# Patient Record
Sex: FEMALE | Race: OTHER | Employment: FULL TIME | URBAN - METROPOLITAN AREA
[De-identification: names, ages, dates, MRNs, and addresses within clinical notes are randomized per-mention and may not be internally consistent; named-entity substitution may affect disease eponyms.]

---

## 2024-03-07 ENCOUNTER — OFFICE VISIT (OUTPATIENT)
Age: 64
End: 2024-03-07
Payer: COMMERCIAL

## 2024-03-07 VITALS
SYSTOLIC BLOOD PRESSURE: 132 MMHG | HEIGHT: 62 IN | WEIGHT: 198.4 LBS | DIASTOLIC BLOOD PRESSURE: 86 MMHG | BODY MASS INDEX: 36.51 KG/M2

## 2024-03-07 DIAGNOSIS — R93.89 THICKENED ENDOMETRIUM: ICD-10-CM

## 2024-03-07 DIAGNOSIS — N95.0 POSTMENOPAUSAL BLEEDING: Primary | ICD-10-CM

## 2024-03-07 PROBLEM — J45.909 ASTHMA: Status: ACTIVE | Noted: 2020-05-15

## 2024-03-07 PROBLEM — I10 HYPERTENSION GOAL BP (BLOOD PRESSURE) < 140/90: Status: ACTIVE | Noted: 2021-04-14

## 2024-03-07 PROCEDURE — 99203 OFFICE O/P NEW LOW 30 MIN: CPT | Performed by: OBSTETRICS & GYNECOLOGY

## 2024-03-07 RX ORDER — ATORVASTATIN CALCIUM 20 MG/1
20 TABLET, FILM COATED ORAL DAILY
COMMUNITY
Start: 2023-10-05

## 2024-03-07 RX ORDER — CYCLOBENZAPRINE HCL 10 MG
10 TABLET ORAL
COMMUNITY
Start: 2024-01-10

## 2024-03-07 RX ORDER — LISINOPRIL 10 MG/1
1 TABLET ORAL EVERY MORNING
COMMUNITY
Start: 2023-12-14

## 2024-03-07 NOTE — ASSESSMENT & PLAN NOTE
Reviewed plan that was previously made prior to transfer from [facility] further sampling with D&C hysteroscopy given thickened endometrium and suboptimal sample from EMB. Sampled that was examined from EMB was negative for hyperplasia, atypia and malignancy. Discussed with patient expectant management and deferring D&C hysteroscopy until she has any further bleeding episodes, but she would like to have this completed now.  Risks, benefits, and alternatives have been reviewed with the patient, including but not limited to infection, bleeding, injury to the uterus and surrounding organs, such as bowel, bladder, blood vessels, nerves and ureters, risks of anesthesia, blood clots and death.  The details of the procedure were discussed in detail. Plan for EUA, D&C, hysteroscopy and poss polypectomy- Consents have been signed and all questions have been answered to her satisfaction.

## 2024-03-07 NOTE — PROGRESS NOTES
Assessment/Plan:       Problem List Items Addressed This Visit       Postmenopausal bleeding - Primary     Reviewed plan that was previously made prior to transfer from [facility]- rec further sampling with D&C hysteroscopy given thickened endometrium and suboptimal sample from EMB. Sampled that was examined from EMB was negative for hyperplasia, atypia and malignancy. Discussed with patient expectant management and deferring D&C hysteroscopy until she has any further bleeding episodes, but she would like to have this completed now.  Risks, benefits, and alternatives have been reviewed with the patient, including but not limited to infection, bleeding, injury to the uterus and surrounding organs, such as bowel, bladder, blood vessels, nerves and ureters, risks of anesthesia, blood clots and death.  The details of the procedure were discussed in detail. Plan for EUA, D&C, hysteroscopy and poss polypectomy- Consents have been signed and all questions have been answered to her satisfaction.           Thickened endometrium         Subjective:      Patient ID: Saira Rosario is a 63 y.o. female here for pre-op visit.     HPI    New patient- was seeing [facility], had to transfer due to insurance reasons.   Last seen in December for postmenopausal bleeding. Workup started by PCP- imaging, pap smear. Ultimately had EMB. Was rec for hysteroscopy D&C due to suboptimal specimen.     EMB 11/28/23-  DIAGNOSIS  :     A. Endometrium, biopsy:   Superficial strips of atrophic endometrium, negative for hyperplasia,   atypia and malignancy.  See note.   Several small pieces of endocervical epithelium with acute inflammation   and reactive change, negative for dysplasia; see comment.     Note:  The quantity of the specimen is suboptimal for the evaluation of   endometrium.  Patient follow up with the consideration for additional   sampling needs to be given if clinical concern remains.       Pap smear 11/28/23    CYTOLOGIC DIAGNOSIS :   Negative  "for Intraepithelial Lesion or Malignancy.   Other Non-Neoplastic Findings:   Reactive/reparative cellular changes associated with inflammation   Atrophic pattern.   Adequacy:   Adequate for evaluation.     Test Code      Result       Result Date/Time   HPV Other Negative      11/30/2023 01:02            HPV 16 Negative      11/30/2023 01:02            HPV 18 Negative      11/30/2023 01:02            HPV Interpretation SEE TEXT                     Text Result/Comments:         HPV types 16, 18, 31, 33, 35, 39, 45, 51, 52, 56, 58, 59, 66, and   68 DNA were not detected.       Had pelvic u/s on 12/22/23-   FINDINGS     The uterus measures 7 x 3.8 x 5.7 cm.  Echogenic myometrial lesions identified measure 2 cm posteriorly and 1.1 cm anteriorly (previously 2.3 cm and 0.9 cm), suggesting lipoleiomyomas (atypical fibroids).  The endometrium measures 7 mm in thickness.   The right ovary measures 1.8 x 2.3 x 0.9 cm and is unremarkable. The left ovary measures 3.1 x 1.5 x 1.1 cm and is unremarkable.   There is no adnexal mass. There is no free fluid in the cul-de-sac.     Has been doing well since last seeing GYN in Dec.   No further bleeding episodes.  Ultimately could not have hysteroscopy, D&C at [facility] due to out of network for insurance- so now transferring care.    Past Medical History:   Diagnosis Date    High blood pressure     High cholesterol        Past Surgical History:   Procedure Laterality Date    BREAST LUMPECTOMY Right     benign    HERNIA REPAIR           The following portions of the patient's history were reviewed and updated as appropriate: allergies, current medications, past family history, past medical history, past social history, past surgical history, and problem list.    Review of Systems    Negative unless otherwise noted in HPI.     Objective:      /86 (BP Location: Left arm, Patient Position: Sitting, Cuff Size: Adult)   Ht 5' 2\" (1.575 m)   Wt 90 kg (198 lb 6.4 oz)   BMI 36.29 kg/m² "        Physical Exam  Constitutional:       General: She is not in acute distress.  HENT:      Head: Normocephalic and atraumatic.      Mouth/Throat:      Mouth: Mucous membranes are moist.   Cardiovascular:      Rate and Rhythm: Normal rate.   Pulmonary:      Effort: Pulmonary effort is normal. No respiratory distress.   Abdominal:      General: There is no distension.      Palpations: Abdomen is soft.      Tenderness: There is no abdominal tenderness.   Genitourinary:     Comments: Deferred to OR  Skin:     General: Skin is warm and dry.   Neurological:      Mental Status: She is alert.   Psychiatric:         Mood and Affect: Mood normal.         Behavior: Behavior normal.

## 2024-03-08 ENCOUNTER — TELEPHONE (OUTPATIENT)
Dept: OBGYN CLINIC | Facility: CLINIC | Age: 64
End: 2024-03-08

## 2024-03-08 NOTE — TELEPHONE ENCOUNTER
----- Message from Rama Pickens DO sent at 3/7/2024  1:44 PM EST -----  [facility] GYN Department  Surgery Scheduling Sheet    Patient Name: Saira Rosario  : 1960    Provider: Rama Pickens DO     Needed: no; Language: N/A    Procedure: exam under anesthesia, dilation and curettage , and hysteroscopy    Diagnosis:  postmenopausal bleeding, thickened endometrium    Special Needs or Equipment: none    Anesthesia: IV sedation with anesthesia    Length of stay: outpatient  Does patient have  comorbid conditions that will require close perioperative monitoring prior to safe discharge: no  The patient has comorbid conditions that will require close perioperative monitoring prior to safe discharge, including N/A. This may require acute care beyond the usual and routine recovery period. As such, inpatient admission post-operatively is expected and appropriate, and anticipated hospital length of stay will be >2 midnights.    Pre-Admission Testing Needed: no   Labs ordered: cbc, cmp, and EKG    PAT's recommended in prep for procedure ordered?: No    Medical Clearance Needed: no; Provider: N/A    MA Form Signed (tubals/hysterectomy): Not Indicated    Surgical Drink Given: no     How many days out of work: 1 week(s)     How many days no drivin day(s)       Are other appointments needed?  no  Interval for post op appt: 2 week(s)     For Surgical Scheduler:    [place]:     Pre-op Appt:     Post op Appt:    Consult/Medical clearance appt:

## 2024-03-08 NOTE — TELEPHONE ENCOUNTER
Talked to patient she is scheduled for her surgical procedure on 4/8/2024 with Dr. Pickens in the [facility] OR, Patient aware of lab work and EKG that is needed. Post op appt scheduled for 4/24/2024 at 2:45pm in the [place] office.

## 2024-03-27 ENCOUNTER — TELEPHONE (OUTPATIENT)
Dept: OBGYN CLINIC | Facility: CLINIC | Age: 64
End: 2024-03-27

## 2024-03-27 NOTE — TELEPHONE ENCOUNTER
Patient called and needed to reschedule her surgical date wit Dr. Pickens, Patient is now rescheduled to 5/13/2024 at the [facility].

## 2024-04-27 ENCOUNTER — APPOINTMENT (OUTPATIENT)
Dept: LAB | Facility: HOSPITAL | Age: 64
End: 2024-04-27
Payer: COMMERCIAL

## 2024-04-27 ENCOUNTER — OFFICE VISIT (OUTPATIENT)
Dept: LAB | Facility: HOSPITAL | Age: 64
End: 2024-04-27
Payer: COMMERCIAL

## 2024-04-27 DIAGNOSIS — Z01.818 PRE-OP TESTING: ICD-10-CM

## 2024-04-27 LAB
ALBUMIN SERPL BCP-MCNC: 4.3 G/DL (ref 3.5–5)
ALP SERPL-CCNC: 68 U/L (ref 34–104)
ALT SERPL W P-5'-P-CCNC: 18 U/L (ref 7–52)
ANION GAP SERPL CALCULATED.3IONS-SCNC: 5 MMOL/L (ref 4–13)
AST SERPL W P-5'-P-CCNC: 16 U/L (ref 13–39)
ATRIAL RATE: 78 BPM
BILIRUB SERPL-MCNC: 0.52 MG/DL (ref 0.2–1)
BUN SERPL-MCNC: 26 MG/DL (ref 5–25)
CALCIUM SERPL-MCNC: 9.1 MG/DL (ref 8.4–10.2)
CHLORIDE SERPL-SCNC: 105 MMOL/L (ref 96–108)
CO2 SERPL-SCNC: 33 MMOL/L (ref 21–32)
CREAT SERPL-MCNC: 0.63 MG/DL (ref 0.6–1.3)
ERYTHROCYTE [DISTWIDTH] IN BLOOD BY AUTOMATED COUNT: 12.2 % (ref 11.6–15.1)
GFR SERPL CREATININE-BSD FRML MDRD: 97 ML/MIN/1.73SQ M
GLUCOSE P FAST SERPL-MCNC: 111 MG/DL (ref 65–99)
HCT VFR BLD AUTO: 42.3 % (ref 34.8–46.1)
HGB BLD-MCNC: 12.7 G/DL (ref 11.5–15.4)
MCH RBC QN AUTO: 28.3 PG (ref 26.8–34.3)
MCHC RBC AUTO-ENTMCNC: 30 G/DL (ref 31.4–37.4)
MCV RBC AUTO: 94 FL (ref 82–98)
P AXIS: 69 DEGREES
PLATELET # BLD AUTO: 242 THOUSANDS/UL (ref 149–390)
PMV BLD AUTO: 12.4 FL (ref 8.9–12.7)
POTASSIUM SERPL-SCNC: 4 MMOL/L (ref 3.5–5.3)
PR INTERVAL: 124 MS
PROT SERPL-MCNC: 7.3 G/DL (ref 6.4–8.4)
QRS AXIS: 17 DEGREES
QRSD INTERVAL: 72 MS
QT INTERVAL: 392 MS
QTC INTERVAL: 446 MS
RBC # BLD AUTO: 4.48 MILLION/UL (ref 3.81–5.12)
SODIUM SERPL-SCNC: 143 MMOL/L (ref 135–147)
T WAVE AXIS: 32 DEGREES
VENTRICULAR RATE: 78 BPM
WBC # BLD AUTO: 8.92 THOUSAND/UL (ref 4.31–10.16)

## 2024-04-27 PROCEDURE — 80053 COMPREHEN METABOLIC PANEL: CPT

## 2024-04-27 PROCEDURE — 93005 ELECTROCARDIOGRAM TRACING: CPT

## 2024-04-27 PROCEDURE — 93010 ELECTROCARDIOGRAM REPORT: CPT | Performed by: INTERNAL MEDICINE

## 2024-04-27 PROCEDURE — 85027 COMPLETE CBC AUTOMATED: CPT

## 2024-04-27 PROCEDURE — 36415 COLL VENOUS BLD VENIPUNCTURE: CPT

## 2024-05-12 ENCOUNTER — ANESTHESIA EVENT (OUTPATIENT)
Dept: PERIOP | Facility: HOSPITAL | Age: 64
End: 2024-05-12
Payer: COMMERCIAL

## 2024-05-13 ENCOUNTER — HOSPITAL ENCOUNTER (OUTPATIENT)
Facility: HOSPITAL | Age: 64
Setting detail: OUTPATIENT SURGERY
Discharge: HOME | End: 2024-05-13
Attending: OBSTETRICS & GYNECOLOGY | Admitting: OBSTETRICS & GYNECOLOGY
Payer: COMMERCIAL

## 2024-05-13 ENCOUNTER — ANESTHESIA (OUTPATIENT)
Dept: PERIOP | Facility: HOSPITAL | Age: 64
End: 2024-05-13
Payer: COMMERCIAL

## 2024-05-13 VITALS
OXYGEN SATURATION: 96 % | RESPIRATION RATE: 18 BRPM | SYSTOLIC BLOOD PRESSURE: 142 MMHG | DIASTOLIC BLOOD PRESSURE: 72 MMHG | TEMPERATURE: 98.8 F | HEIGHT: 62 IN | WEIGHT: 198.41 LBS | HEART RATE: 87 BPM | BODY MASS INDEX: 36.51 KG/M2

## 2024-05-13 DIAGNOSIS — R93.89 THICKENED ENDOMETRIUM: ICD-10-CM

## 2024-05-13 DIAGNOSIS — N95.0 PMB (POSTMENOPAUSAL BLEEDING): ICD-10-CM

## 2024-05-13 PROBLEM — N84.0 ENDOMETRIAL POLYP: Status: ACTIVE | Noted: 2024-05-13

## 2024-05-13 PROCEDURE — NC001 PR NO CHARGE: Performed by: OBSTETRICS & GYNECOLOGY

## 2024-05-13 PROCEDURE — 58558 HYSTEROSCOPY BIOPSY: CPT | Performed by: OBSTETRICS & GYNECOLOGY

## 2024-05-13 PROCEDURE — 88305 TISSUE EXAM BY PATHOLOGIST: CPT | Performed by: PATHOLOGY

## 2024-05-13 RX ORDER — ACETAMINOPHEN 325 MG/1
975 TABLET ORAL EVERY 6 HOURS PRN
Status: DISCONTINUED | OUTPATIENT
Start: 2024-05-13 | End: 2024-05-13 | Stop reason: HOSPADM

## 2024-05-13 RX ORDER — MAGNESIUM HYDROXIDE 1200 MG/15ML
LIQUID ORAL AS NEEDED
Status: DISCONTINUED | OUTPATIENT
Start: 2024-05-13 | End: 2024-05-13 | Stop reason: HOSPADM

## 2024-05-13 RX ORDER — HYDROMORPHONE HCL/PF 1 MG/ML
0.5 SYRINGE (ML) INJECTION
Status: CANCELLED | OUTPATIENT
Start: 2024-05-13

## 2024-05-13 RX ORDER — LIDOCAINE HYDROCHLORIDE 10 MG/ML
INJECTION, SOLUTION EPIDURAL; INFILTRATION; INTRACAUDAL; PERINEURAL AS NEEDED
Status: DISCONTINUED | OUTPATIENT
Start: 2024-05-13 | End: 2024-05-13

## 2024-05-13 RX ORDER — PROPOFOL 10 MG/ML
INJECTION, EMULSION INTRAVENOUS CONTINUOUS PRN
Status: DISCONTINUED | OUTPATIENT
Start: 2024-05-13 | End: 2024-05-13

## 2024-05-13 RX ORDER — MIDAZOLAM HYDROCHLORIDE 2 MG/2ML
INJECTION, SOLUTION INTRAMUSCULAR; INTRAVENOUS AS NEEDED
Status: DISCONTINUED | OUTPATIENT
Start: 2024-05-13 | End: 2024-05-13

## 2024-05-13 RX ORDER — IBUPROFEN 600 MG/1
600 TABLET ORAL EVERY 6 HOURS PRN
Status: DISCONTINUED | OUTPATIENT
Start: 2024-05-13 | End: 2024-05-13 | Stop reason: HOSPADM

## 2024-05-13 RX ORDER — GLYCOPYRROLATE 0.2 MG/ML
INJECTION INTRAMUSCULAR; INTRAVENOUS AS NEEDED
Status: DISCONTINUED | OUTPATIENT
Start: 2024-05-13 | End: 2024-05-13

## 2024-05-13 RX ORDER — SODIUM CHLORIDE, SODIUM LACTATE, POTASSIUM CHLORIDE, CALCIUM CHLORIDE 600; 310; 30; 20 MG/100ML; MG/100ML; MG/100ML; MG/100ML
INJECTION, SOLUTION INTRAVENOUS CONTINUOUS PRN
Status: DISCONTINUED | OUTPATIENT
Start: 2024-05-13 | End: 2024-05-13

## 2024-05-13 RX ORDER — FENTANYL CITRATE 50 UG/ML
INJECTION, SOLUTION INTRAMUSCULAR; INTRAVENOUS AS NEEDED
Status: DISCONTINUED | OUTPATIENT
Start: 2024-05-13 | End: 2024-05-13

## 2024-05-13 RX ORDER — ONDANSETRON 2 MG/ML
4 INJECTION INTRAMUSCULAR; INTRAVENOUS ONCE AS NEEDED
Status: CANCELLED | OUTPATIENT
Start: 2024-05-13

## 2024-05-13 RX ORDER — KETAMINE HYDROCHLORIDE 50 MG/ML
INJECTION, SOLUTION INTRAMUSCULAR; INTRAVENOUS AS NEEDED
Status: DISCONTINUED | OUTPATIENT
Start: 2024-05-13 | End: 2024-05-13

## 2024-05-13 RX ORDER — FENTANYL CITRATE/PF 50 MCG/ML
50 SYRINGE (ML) INJECTION
Status: CANCELLED | OUTPATIENT
Start: 2024-05-13

## 2024-05-13 RX ADMIN — KETAMINE HYDROCHLORIDE 10 MG: 50 INJECTION INTRAMUSCULAR; INTRAVENOUS at 08:12

## 2024-05-13 RX ADMIN — LIDOCAINE HYDROCHLORIDE 50 MG: 10 INJECTION, SOLUTION EPIDURAL; INFILTRATION; INTRACAUDAL; PERINEURAL at 08:02

## 2024-05-13 RX ADMIN — MIDAZOLAM HYDROCHLORIDE 2 MG: 1 INJECTION, SOLUTION INTRAMUSCULAR; INTRAVENOUS at 07:55

## 2024-05-13 RX ADMIN — GLYCOPYRROLATE 0.1 MG: 0.2 INJECTION INTRAMUSCULAR; INTRAVENOUS at 07:55

## 2024-05-13 RX ADMIN — PROPOFOL 70 MCG/KG/MIN: 10 INJECTION, EMULSION INTRAVENOUS at 08:02

## 2024-05-13 RX ADMIN — KETAMINE HYDROCHLORIDE 10 MG: 50 INJECTION INTRAMUSCULAR; INTRAVENOUS at 08:02

## 2024-05-13 RX ADMIN — SODIUM CHLORIDE, SODIUM LACTATE, POTASSIUM CHLORIDE, AND CALCIUM CHLORIDE: .6; .31; .03; .02 INJECTION, SOLUTION INTRAVENOUS at 07:55

## 2024-05-13 RX ADMIN — FENTANYL CITRATE 50 MCG: 50 INJECTION, SOLUTION INTRAMUSCULAR; INTRAVENOUS at 08:10

## 2024-05-13 RX ADMIN — FENTANYL CITRATE 50 MCG: 50 INJECTION, SOLUTION INTRAMUSCULAR; INTRAVENOUS at 08:02

## 2024-05-13 NOTE — ANESTHESIA PREPROCEDURE EVALUATION
Procedure:  D&C HYSTEROSCOPY, EXAM UNDER ANESTHESIA (Uterus)  POSSIBLE POLYPECTOMY (Uterus)    Relevant Problems   CARDIO   (+) HLD (hyperlipidemia)   (+) Hypertension goal BP (blood pressure) < 140/90      PULMONARY   (+) Asthma        Physical Exam    Airway    Mallampati score: III         Dental   No notable dental hx     Cardiovascular  Cardiovascular exam normal    Pulmonary  Pulmonary exam normal     Other Findings  post-pubertal.    Anesthesia Plan  ASA Score- 2     Anesthesia Type- IV sedation with anesthesia with ASA Monitors.         Additional Monitors:     Airway Plan:            Plan Factors-Exercise tolerance (METS): >4 METS.    Chart reviewed.    Patient summary reviewed.    Patient is not a current smoker.              Induction-     Postoperative Plan-     Informed Consent- Anesthetic plan and risks discussed with patient.  I personally reviewed this patient with the CRNA. Discussed and agreed on the Anesthesia Plan with the CRNA..

## 2024-05-13 NOTE — ANESTHESIA POSTPROCEDURE EVALUATION
Post-Op Assessment Note    CV Status:  Stable  Pain Score: 0    Pain management: adequate       Mental Status:  Alert and awake   Hydration Status:  Euvolemic   PONV Controlled:  Controlled   Airway Patency:  Patent and adequate  Two or more mitigation strategies used for obstructive sleep apnea   Post Op Vitals Reviewed: Yes    No anethesia notable event occurred.    Staff: CRNA               BP   129/75   Temp 98.8   Pulse 100   Resp 20   SpO2 94

## 2024-05-13 NOTE — OP NOTE
OPERATIVE REPORT  PATIENT NAME: Saira Rosario    :  1960  MRN: [id number]  Pt Location: [facility] OR ROOM 02    SURGERY DATE: 2024    Surgeons and Role:     * Rama Pickens DO - Primary    Preop Diagnosis:  PMB (postmenopausal bleeding) [N95.0]  Thickened endometrium [R93.89]    Post-Op Diagnosis Codes:     * PMB (postmenopausal bleeding) [N95.0]     * Thickened endometrium [R93.89]    Procedure(s):  D&C HYSTEROSCOPY. EXAM UNDER ANESTHESIA  POSSIBLE POLYPECTOMY    Specimen(s):  ID Type Source Tests Collected by Time Destination   1 : ENDOMETRIAL CURETTINGS Tissue Endometrium TISSUE EXAM Rama Emyclive  2024 0827    2 : ENDOMETRIAL POLYP Tissue Polyp, Cervical/Endometrial TISSUE EXAM Rama Pickens  2024 0829        Estimated Blood Loss:   Minimal    Drains:  * No LDAs found *    Anesthesia Type:   IV Sedation with Anesthesia    Operative Indications:  PMB (postmenopausal bleeding) [N95.0]  Thickened endometrium [R93.89]  Endometrial polyp    Operative Findings:  Normal appearing external genitalia, small anteverted uterus sounding to 7 cm, endometrial polyp attached at right fundus extending down through cervix, otherwise atrophic appearing endometrium.     Complications:   None    Specimens: endometrial currettings and endometrial polyp    Procedure and Technique:      Procedure in Detail:  The patient was taken to the Operating Room where she was identified as Saira Rosario.  Monitored Local Anesthesia with Sedation anesthesia was obtained without difficulty and the patient was placed in the dorsal lithotomy position where the exam under anesthesia revealed the above noted findings.  The vagina and perineum were prepped and draped in the usual sterile fashion.  A time out procedure was performed and after all team members agreed, a weighted speculum was placed in the patient's vagina with a Jaky to retract anteriorly. The anterior lip of the cervix was grasped with a single tooth  tenaculum.  The uterus was sounded in an anteverted fashion to 7 cm.  The cervix was gently dilated up to 23 Fr. using the Ted dilators without difficulty.  The hysteroscope was advanced into the uterine cavity with the above noted findings.   The polyp forceps were advanced into the uterine cavity and with 1 passes, the polyp was obtained for specimen. The hysteroscope was advanced again into the cavity and endometrial polyp was still remaining. The symphion resectoscope was utilized to resect the remaining polyp under direct visualization. Using a sharp curette, gentle curettage was performed.  Good hemostasis was noted.  Endometrial curettings were passed off the field for specimen.  There was no bleeding noted at the os.  Tenaculum was removed.  No bleeding was noted at the tenaculum site.  Weighted speculum was removed.     The patient tolerated the procedure well.  Sponge, lap, needle and instrument counts were correct x 2.  The patient was taken to the Recovery Room in an awake and stable condition.       Patient Disposition:  PACU         SIGNATURE: Rama Pickens DO  DATE: May 13, 2024  TIME: 8:31 AM

## 2024-05-13 NOTE — H&P
H&P Exam - Gynecology   Saira Rosario 63 y.o. female MRN: [id number]  Unit/Bed#: OR POOL Encounter: [id number]    Assessment/Plan     Assessment/ Plan:  Postmenopausal bleeding - Primary        Reviewed plan that was previously made prior to transfer from [facility]- rec further sampling with D&C hysteroscopy given thickened endometrium and suboptimal sample from EMB. Sampled that was examined from EMB was negative for hyperplasia, atypia and malignancy. Discussed with patient expectant management and deferring D&C hysteroscopy until she has any further bleeding episodes, but she would like to have this completed now.  Risks, benefits, and alternatives have been reviewed with the patient, including but not limited to infection, bleeding, injury to the uterus and surrounding organs, such as bowel, bladder, blood vessels, nerves and ureters, risks of anesthesia, blood clots and death.  The details of the procedure were discussed in detail. Plan for EUA, D&C, hysteroscopy and poss polypectomy- Consents have been signed and all questions have been answered to her satisfaction.              Thickened endometrium         History of Present Illness     HPI:  Saira Rosario is a 63 y.o. female who presents today for scheduled surgery. She was seen at [facility] in Dec for PMB and subsequent suboptimal EMB in the office. Was rec for d&C hysteroscopy prior to transfer of care,  had to transfer to [facility] due to insurance reasons.    EMB 11/28/23  A. Endometrium, biopsy:   Superficial strips of atrophic endometrium, negative for hyperplasia,   atypia and malignancy.  See note.   Several small pieces of endocervical epithelium with acute inflammation   and reactive change, negative for dysplasia; see comment.     Note:  The quantity of the specimen is suboptimal for the evaluation of   endometrium.  Patient follow up with the consideration for additional   sampling needs to be given if clinical concern remains.     Pelvic u/s 12/22/23-  "  FINDINGS     The uterus measures 7 x 3.8 x 5.7 cm.  Echogenic myometrial lesions identified measure 2 cm posteriorly and 1.1 cm anteriorly (previously 2.3 cm and 0.9 cm), suggesting lipoleiomyomas (atypical fibroids).  The endometrium measures 7 mm in thickness.   The right ovary measures 1.8 x 2.3 x 0.9 cm and is unremarkable. The left ovary measures 3.1 x 1.5 x 1.1 cm and is unremarkable.   There is no adnexal mass. There is no free fluid in the cul-de-sac.     Review of Systems  Negative unless otherwise noted in Hpi.       Historical Information   Past Medical History:   Diagnosis Date    High blood pressure     High cholesterol      Past Surgical History:   Procedure Laterality Date    BREAST LUMPECTOMY Right     benign    HERNIA REPAIR       OB/GYN History:    Family History   Problem Relation Age of Onset    Fibroids Mother     Prostate cancer Father     Fibroids Sister     Breast cancer Maternal Grandmother     Colon cancer Neg Hx     Ovarian cancer Neg Hx      Social History   Social History     Substance and Sexual Activity   Alcohol Use Never     Social History     Substance and Sexual Activity   Drug Use Never     Social History     Tobacco Use   Smoking Status Never   Smokeless Tobacco Never     E-Cigarette/Vaping    E-Cigarette Use Never User      E-Cigarette/Vaping Substances       Meds/Allergies   all current active meds have been reviewed and current meds:   No current facility-administered medications for this encounter.     No Known Allergies    Objective   Vitals: Blood pressure 162/85, pulse 99, temperature 97.8 °F (36.6 °C), temperature source Temporal, resp. rate 18, height 5' 2\" (1.575 m), weight 90 kg (198 lb 6.6 oz), SpO2 96%.    No intake or output data in the 24 hours ending 24 0741    Invasive Devices:   Invasive Devices       Peripheral Intravenous Line  Duration             Peripheral IV 24 Dorsal (posterior);Right Hand <1 day                    Physical " Exam  Constitutional:       General: She is not in acute distress.     Appearance: She is obese.   HENT:      Head: Normocephalic and atraumatic.      Mouth/Throat:      Mouth: Mucous membranes are moist.   Cardiovascular:      Rate and Rhythm: Normal rate.   Pulmonary:      Effort: Pulmonary effort is normal. No respiratory distress.   Abdominal:      General: There is no distension.      Palpations: Abdomen is soft.   Genitourinary:     Comments: Deferred to OR  Musculoskeletal:      Comments: Extremities without obvious deformity or injury   Skin:     General: Skin is warm and dry.   Neurological:      Mental Status: She is alert.   Psychiatric:         Mood and Affect: Mood normal.         Behavior: Behavior normal.         Lab Results: I have personally reviewed pertinent reports.    Imaging: I have personally reviewed pertinent reports.    EKG, Pathology, and Other Studies: I have personally reviewed pertinent reports.      Code Status: No Order  Advance Directive and Living Will:      Power of :    POLST:

## 2024-05-16 PROCEDURE — 88305 TISSUE EXAM BY PATHOLOGIST: CPT | Performed by: PATHOLOGY

## 2024-05-20 ENCOUNTER — TELEPHONE (OUTPATIENT)
Age: 64
End: 2024-05-20

## 2024-05-20 NOTE — TELEPHONE ENCOUNTER
----- Message from Rama Pickens DO sent at 5/20/2024  9:56 AM EDT -----  Benign pathology from surgery- will review at postop visit. Please let pt know

## 2024-05-21 NOTE — TELEPHONE ENCOUNTER
Called patient reviewed results and note from provider. Patient verbalized understanding. No further questions at this time

## 2024-05-29 ENCOUNTER — OFFICE VISIT (OUTPATIENT)
Age: 64
End: 2024-05-29

## 2024-05-29 VITALS — DIASTOLIC BLOOD PRESSURE: 84 MMHG | SYSTOLIC BLOOD PRESSURE: 126 MMHG | WEIGHT: 196 LBS | BODY MASS INDEX: 35.85 KG/M2

## 2024-05-29 DIAGNOSIS — Z98.890 S/P DILATION AND CURETTAGE: ICD-10-CM

## 2024-05-29 DIAGNOSIS — R10.2 PELVIC PAIN: Primary | ICD-10-CM

## 2024-05-29 PROCEDURE — 87660 TRICHOMONAS VAGIN DIR PROBE: CPT | Performed by: OBSTETRICS & GYNECOLOGY

## 2024-05-29 PROCEDURE — 87480 CANDIDA DNA DIR PROBE: CPT | Performed by: OBSTETRICS & GYNECOLOGY

## 2024-05-29 PROCEDURE — 99024 POSTOP FOLLOW-UP VISIT: CPT | Performed by: OBSTETRICS & GYNECOLOGY

## 2024-05-29 PROCEDURE — 87510 GARDNER VAG DNA DIR PROBE: CPT | Performed by: OBSTETRICS & GYNECOLOGY

## 2024-05-29 NOTE — ASSESSMENT & PLAN NOTE
Pt doing well postoperatively- notes some cramping intermittently, feels like might be getting her period soon  Affirm collected today, no s/s of infection otherwise  Reviewed to monitor and call with further episodes of PMB  RTO for annual

## 2024-05-29 NOTE — PROGRESS NOTES
"Ambulatory Visit  Name: Saira Rosario      : 1960      MRN: [id number]  Encounter Provider: Rama Pickens DO  Encounter Date: 2024   Encounter department: [facility] [place]    Assessment & Plan   1. Pelvic pain  -     VAGINOSIS DNA PROBE  2. S/P dilation and curettage  Assessment & Plan:  Pt doing well postoperatively- notes some cramping intermittently, feels like might be getting her period soon  Affirm collected today, no s/s of infection otherwise  Reviewed to monitor and call with further episodes of PMB  RTO for annual      History of Present Illness       Saira Rosario is a 63 y.o. female who presents today for post-op visit.   Pt is sp hysteroscopy, D&C and polypectomy on  for PMB.     Path   Final Diagnosis   A. Uterine contents, \"Endometrial curettings,\" Endometrial curettage:  - Inactive endometrium  - Negative for hyperplasia and carcinoma     B. Uterine contents, \"Endometrial polyp,\" Endometrial curettage:  - Fragments of endometrial polyp  - Negative for hyperplasia and carcinoma     Since surgery she has been doing well  Has cramping intermittently.   Had some bleeding sp procedure this has resolved.   She denies abnormal discharge, fever or chills.  She is tolerating po without issues.     Review of Systems  Negative unless otherwise noted in HPI.     Past Medical History   Past Medical History:   Diagnosis Date    High blood pressure     High cholesterol      Past Surgical History:   Procedure Laterality Date    BREAST LUMPECTOMY Right     benign    HERNIA REPAIR      POLYPECTOMY N/A 2024    Procedure: POLYPECTOMY;  Surgeon: Rama Pickens DO;  Location: [facility] MA[facility];  Service: Gynecology    ND HYSTEROSCOPY BX ENDOMETRIUM&/POLYPC W/WO D&C N/A 2024    Procedure: D&C HYSTEROSCOPY, EXAM UNDER ANESTHESIA;  Surgeon: Rama Pickens DO;  Location: [facility];  Service: Gynecology     Family History   Problem Relation Age of " Onset    Fibroids Mother     Prostate cancer Father     Fibroids Sister     Breast cancer Maternal Grandmother     Colon cancer Neg Hx     Ovarian cancer Neg Hx      Current Outpatient Medications on File Prior to Visit   Medication Sig Dispense Refill    atorvastatin (LIPITOR) 20 mg tablet Take 20 mg by mouth daily      liraglutide (VICTOZA) injection Inject under the skin daily      lisinopril (ZESTRIL) 10 mg tablet Take 1 tablet by mouth every morning      cyclobenzaprine (FLEXERIL) 10 mg tablet Take 10 mg by mouth (Patient not taking: Reported on 5/29/2024)      Lidocaine-Menthol 4-1 % CREA Apply topically (Patient not taking: Reported on 5/29/2024)       No current facility-administered medications on file prior to visit.   No Known Allergies   Objective     /84 (BP Location: Left arm, Patient Position: Sitting, Cuff Size: Large)   Wt 88.9 kg (196 lb)   BMI 35.85 kg/m²     Physical Exam  Constitutional:       General: She is not in acute distress.  HENT:      Head: Normocephalic and atraumatic.      Mouth/Throat:      Mouth: Mucous membranes are moist.   Cardiovascular:      Rate and Rhythm: Normal rate.   Pulmonary:      Effort: Pulmonary effort is normal. No respiratory distress.   Abdominal:      General: There is no distension.      Palpations: Abdomen is soft.      Tenderness: There is no abdominal tenderness.   Genitourinary:     Comments: Normal external genitalia, normal vaginal mucosa  Scant vaginal discharge  Cervix appears normal  No bleeding  No CMT or tenderness on bimanual exam  Skin:     General: Skin is warm and dry.   Psychiatric:         Mood and Affect: Mood normal.         Behavior: Behavior normal.

## 2024-05-30 DIAGNOSIS — B96.89 BV (BACTERIAL VAGINOSIS): Primary | ICD-10-CM

## 2024-05-30 DIAGNOSIS — N76.0 BV (BACTERIAL VAGINOSIS): Primary | ICD-10-CM

## 2024-05-30 LAB
CANDIDA RRNA VAG QL PROBE: NOT DETECTED
G VAGINALIS RRNA GENITAL QL PROBE: DETECTED
T VAGINALIS RRNA GENITAL QL PROBE: NOT DETECTED

## 2024-05-30 RX ORDER — METRONIDAZOLE 500 MG/1
500 TABLET ORAL EVERY 12 HOURS SCHEDULED
Qty: 14 TABLET | Refills: 0 | Status: SHIPPED | OUTPATIENT
Start: 2024-05-30 | End: 2024-06-06

## 2024-06-03 ENCOUNTER — TELEPHONE (OUTPATIENT)
Age: 64
End: 2024-06-03

## 2024-06-03 NOTE — TELEPHONE ENCOUNTER
----- Message from Rama Pickens DO sent at 5/30/2024 12:56 PM EDT -----  +BV on affirm. Flagyl sent to pharmacy, please let patient know

## 2024-06-03 NOTE — TELEPHONE ENCOUNTER
Per comm consent lm to pt with results and recommendations of culture from Dr Pickens . Script sent for BV

## 2024-06-05 NOTE — TELEPHONE ENCOUNTER
Patient made aware and verbalized understanding of the results, picking up script today. Documenting here.

## 2024-06-26 ENCOUNTER — TELEPHONE (OUTPATIENT)
Age: 64
End: 2024-06-26

## 2024-06-26 NOTE — TELEPHONE ENCOUNTER
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 She is scheduled for a physical for employment provided her the phone number and the [place] location at [address] [place] [address].  [phone]

## 2024-06-27 ENCOUNTER — OCCMED (OUTPATIENT)
Dept: URGENT CARE | Facility: CLINIC | Age: 64
End: 2024-06-27

## 2024-06-27 DIAGNOSIS — Z02.1 PHYSICAL EXAM, PRE-EMPLOYMENT: Primary | ICD-10-CM

## 2024-06-27 LAB
MEV IGG SER QL IA: NORMAL
MUV IGG SER QL IA: NORMAL
RUBV IGG SERPL IA-ACNC: 286.2 IU/ML
VZV IGG SER QL IA: NORMAL

## 2024-06-27 PROCEDURE — 86787 VARICELLA-ZOSTER ANTIBODY: CPT

## 2024-06-27 PROCEDURE — 86762 RUBELLA ANTIBODY: CPT

## 2024-06-27 PROCEDURE — 86735 MUMPS ANTIBODY: CPT

## 2024-06-27 PROCEDURE — 86480 TB TEST CELL IMMUN MEASURE: CPT

## 2024-06-27 PROCEDURE — 86765 RUBEOLA ANTIBODY: CPT

## 2024-06-28 LAB
GAMMA INTERFERON BACKGROUND BLD IA-ACNC: 0.06 IU/ML
M TB IFN-G BLD-IMP: NEGATIVE
M TB IFN-G CD4+ BCKGRND COR BLD-ACNC: -0.03 IU/ML
M TB IFN-G CD4+ BCKGRND COR BLD-ACNC: 0.02 IU/ML
MITOGEN IGNF BCKGRD COR BLD-ACNC: 9.94 IU/ML

## 2024-12-11 ENCOUNTER — ANNUAL EXAM (OUTPATIENT)
Dept: OBGYN CLINIC | Facility: CLINIC | Age: 64
End: 2024-12-11
Payer: COMMERCIAL

## 2024-12-11 VITALS
WEIGHT: 188 LBS | DIASTOLIC BLOOD PRESSURE: 88 MMHG | SYSTOLIC BLOOD PRESSURE: 128 MMHG | HEIGHT: 62 IN | BODY MASS INDEX: 34.6 KG/M2

## 2024-12-11 DIAGNOSIS — Z01.419 ENCOUNTER FOR ANNUAL ROUTINE GYNECOLOGICAL EXAMINATION: Primary | ICD-10-CM

## 2024-12-11 DIAGNOSIS — Z98.890 S/P DILATION AND CURETTAGE: ICD-10-CM

## 2024-12-11 DIAGNOSIS — Z78.0 ASYMPTOMATIC POSTMENOPAUSAL STATUS: ICD-10-CM

## 2024-12-11 DIAGNOSIS — N89.8 VAGINAL ODOR: ICD-10-CM

## 2024-12-11 DIAGNOSIS — Z12.31 SCREENING MAMMOGRAM FOR BREAST CANCER: ICD-10-CM

## 2024-12-11 DIAGNOSIS — M79.622 PAIN IN LEFT AXILLA: ICD-10-CM

## 2024-12-11 PROBLEM — N95.0 POSTMENOPAUSAL BLEEDING: Status: RESOLVED | Noted: 2024-03-07 | Resolved: 2024-12-11

## 2024-12-11 PROBLEM — M51.369 LUMBAR DEGENERATIVE DISC DISEASE: Status: ACTIVE | Noted: 2022-07-26

## 2024-12-11 PROBLEM — H25.13 AGE-RELATED NUCLEAR CATARACT, BILATERAL: Status: ACTIVE | Noted: 2023-04-14

## 2024-12-11 PROBLEM — H05.89 THYROID ORBITOPATHY: Status: ACTIVE | Noted: 2024-07-25

## 2024-12-11 PROBLEM — N84.0 ENDOMETRIAL POLYP: Status: RESOLVED | Noted: 2024-05-13 | Resolved: 2024-12-11

## 2024-12-11 PROBLEM — E07.9 THYROID ORBITOPATHY: Status: ACTIVE | Noted: 2024-07-25

## 2024-12-11 PROBLEM — G47.33 OSA (OBSTRUCTIVE SLEEP APNEA): Status: ACTIVE | Noted: 2024-03-25

## 2024-12-11 PROBLEM — Z80.0 FAMILY HISTORY OF COLON CANCER: Status: ACTIVE | Noted: 2018-02-20

## 2024-12-11 PROBLEM — K11.1 PAROTID GLAND ENLARGEMENT: Status: ACTIVE | Noted: 2024-07-25

## 2024-12-11 PROBLEM — R93.89 THICKENED ENDOMETRIUM: Status: RESOLVED | Noted: 2024-03-07 | Resolved: 2024-12-11

## 2024-12-11 PROCEDURE — S0612 ANNUAL GYNECOLOGICAL EXAMINA: HCPCS | Performed by: NURSE PRACTITIONER

## 2024-12-11 PROCEDURE — G0145 SCR C/V CYTO,THINLAYER,RESCR: HCPCS | Performed by: NURSE PRACTITIONER

## 2024-12-11 RX ORDER — METRONIDAZOLE 7.5 MG/G
1 GEL VAGINAL
Qty: 5 G | Refills: 0 | Status: SHIPPED | OUTPATIENT
Start: 2024-12-11 | End: 2024-12-16

## 2024-12-11 NOTE — PROGRESS NOTES
Diagnoses and all orders for this visit:    Encounter for annual routine gynecological examination    Asymptomatic postmenopausal status    S/P dilation and curettage    Vaginal odor  -     metroNIDAZOLE (METROGEL) 0.75 % vaginal gel; Insert 1 Application into the vagina daily at bedtime for 5 days    Pain in left axilla  -     US Breast Axilla Left; Future    Screening mammogram for breast cancer  -     Mammo screening bilateral w 3d and cad; Future    Call as needed, encouraged calcium/vit D in her diet, call with any PMB, all questions answered          Pleasant 64 y.o. NP postmenopausal female here for annual exam and multiple complaints. She denies any further postmenopausal bleeding. Patient had a hysteroscopy, D&C and polypectomy on 5/13/24 for PMB by Dr Pickens.  She denies history of abnormal pap smears, last Pap done in 2023 neg/neg, mulitple nml Paps in chart from [facility]. Patient requests a Pap be done today. Hx of Trichomonas noted years ago. She has vaginal issues with odor, med sent to the pharmacy. She denies pelvic pain. She denies postmenopausal issues. She is sexually active.  She denies any concerns for STDs. Monogamous. Colonoscopy done 2024, due again in 5 years . Last mammogram 08/09/2024 normal, not dense. Family hx of colon cancer.  She reports left axillary pain for the past few months.  She had been getting left diagnostic imaging for presumed cysts.    Past Medical History:   Diagnosis Date    Endometrial polyp 05/13/2024    High blood pressure     High cholesterol      Past Surgical History:   Procedure Laterality Date    BREAST LUMPECTOMY Right     benign    HERNIA REPAIR      POLYPECTOMY N/A 5/13/2024    Procedure: POLYPECTOMY;  Surgeon: Rama Pickens DO;  Location: [facility];  Service: Gynecology    AZ HYSTEROSCOPY BX ENDOMETRIUM&/POLYPC W/WO D&C N/A 5/13/2024    Procedure: D&C HYSTEROSCOPY, EXAM UNDER ANESTHESIA;  Surgeon: Rama Pickens DO;  Location: [facility];  Service:  "Gynecology     Family History   Problem Relation Age of Onset    Fibroids Mother     Prostate cancer Father     Fibroids Sister     Breast cancer Maternal Grandmother     Colon cancer Neg Hx     Ovarian cancer Neg Hx      Social History     Tobacco Use    Smoking status: Never    Smokeless tobacco: Never   Vaping Use    Vaping status: Never Used   Substance Use Topics    Alcohol use: Never    Drug use: Never       Current Outpatient Medications:     atorvastatin (LIPITOR) 20 mg tablet, Take 20 mg by mouth daily, Disp: , Rfl:     lisinopril (ZESTRIL) 10 mg tablet, Take 1 tablet by mouth every morning, Disp: , Rfl:     metroNIDAZOLE (METROGEL) 0.75 % vaginal gel, Insert 1 Application into the vagina daily at bedtime for 5 days, Disp: 5 g, Rfl: 0    liraglutide (VICTOZA) injection, Inject under the skin daily, Disp: , Rfl:   Patient Active Problem List    Diagnosis Date Noted    Parotid gland enlargement 2024    Thyroid orbitopathy 2024    S/P dilation and curettage 2024    LUANNE (obstructive sleep apnea) 2024    Age-related nuclear cataract, bilateral 2023    Lumbar degenerative disc disease 2022    Hypertension goal BP (blood pressure) < 140/90 2021    Asthma 05/15/2020    Family history of colon cancer 2018    HLD (hyperlipidemia) 03/15/2013       No Known Allergies    OB History    Para Term  AB Living   2 2 2   2   SAB IAB Ectopic Multiple Live Births       2      # Outcome Date GA Lbr [place]/2nd Weight Sex Type Anes PTL Lv   2 Term     M Vag-Spont   RAKAN   1 Term     M Vag-Spont   RAKAN     1 grandson,5  [facility] [employer]    Vitals:    24 1039   BP: 128/88   BP Location: Left arm   Patient Position: Sitting   Cuff Size: Standard   Weight: 85.3 kg (188 lb)   Height: 5' 2\" (1.575 m)     Body mass index is 34.39 kg/m².    Review of Systems   Constitutional: Negative for chills, fatigue, fever and unexpected " weight change but it fluctuates.   Respiratory: Negative for shortness of breath.    Gastrointestinal: Negative for anal bleeding, blood in stool, constipation and diarrhea.   Genitourinary: Negative for difficulty urinating, dysuria and hematuria.     Physical Exam   Constitutional: She appears well-developed and well-nourished. No distress.   HENT: atraumatic, EOMI  Head: Normocephalic.   Neck: Normal range of motion. Neck supple.   Pulmonary: Effort normal.  Breasts: bilateral without masses, skin changes or nipple discharge. Bilaterally soft and warm to touch. No areas of erythema or pain. Bilateral axilla wnl.  Abdominal: Soft.   Pelvic exam was performed with patient supine. No labial fusion. There is no rash, tenderness, lesion or injury on the right labia. There is no rash, tenderness, lesion or injury on the left labia. Urethral meatus does not show any tenderness, inflammation or discharge. Palpation of midline bladder without pain or discomfort. Uterus is not deviated, not enlarged, not fixed and not tender. Cervix exhibits no motion tenderness, no discharge and no friability.  Tiny nabothian cyst noted at 12 oclock.  Right adnexum displays no mass, no tenderness and no fullness. Left adnexum displays no mass, no tenderness and no fullness. No erythema or tenderness in the vagina. No foreign body in the vagina. No signs of injury around the vagina or anus. Perineum without lesions, signs of injury, erythema or swelling. No vaginal discharge found.   Lymphadenopathy:        Right: No inguinal adenopathy present.        Left: No inguinal adenopathy present.      Quality 110: Preventive Care And Screening: Influenza Immunization: Influenza immunization was not ordered or administered, reason not given Detail Level: Detailed

## 2024-12-11 NOTE — PATIENT INSTRUCTIONS
Patient Education     Lowering Your Risk of Breast Cancer   About this topic   Breast cancer is a serious illness. Breast cancer is when abnormal cells grow and divide more quickly in your breast. These cells form a growth or tumor. The abnormal cells may enter nearby tissue and spread to other parts of the body. It is the type of cancer most often seen in women. Men can have breast cancer, but it is a rare condition.  General   Some things in your life may increase your risk of breast cancer. You may not be able to change some of these. Others you can control.  You are more likely to get breast cancer if you:  Have a mother, sister, or daughter who has had breast cancer  Have used hormones for menopause for more than 5 years  Have had radiation therapy to the breast or chest in the past  Are overweight or do not exercise  Had your first menstrual period before you were 11 years old  Went through menopause after age 57  Have never been pregnant or had your first child after age 35  Have had breast cancer before  Drink alcohol in any form  Have dense breasts  Are older in age  There is no certain way to prevent breast cancer. There are things you can do to lower your chances of having breast cancer.  Keep a healthy weight. Lose weight if you are overweight. Being overweight raises your chances of having breast cancer.  Eat a healthy diet to maintain a healthy weight, such as more fruits, vegetables, and lean cuts of meat. Decrease the amount of saturated fat in your diet.  Exercise. Being active helps you keep a healthy weight.  Limit your alcohol intake or do not drink alcohol. The more alcohol you drink, the higher your risk.  Do not smoke cigarettes. Smoking can increase your risk of many types of cancer.  Breastfeed your baby. This may help protect you. The longer you breastfeed, the more protection you have.  Talk with your doctor about:  Limiting or stopping hormone therapy.  Taking certain drugs to prevent  breast cancer. For women at high risk of having breast cancer, there are a few drugs that may lower your risk.  Surgery to prevent you from having breast cancer if you are very high risk.  When do I need to call the doctor?   Changes in your breasts  A lump or area in your breast that feels different  Discharge from your nipple  Skin on your breast is dimpled or indented  You have questions or concerns about your breasts  Helpful tips   Talk to your doctor about the best kind of breast cancer screening for you.  If you want to do self breast exams, have your doctor show you the right way to do them.  Tell your doctor of any abnormal finding.  Last Reviewed Date   2021-10-04  Consumer Information Use and Disclaimer   This generalized information is a limited summary of diagnosis, treatment, and/or medication information. It is not meant to be comprehensive and should be used as a tool to help the user understand and/or assess potential diagnostic and treatment options. It does NOT include all information about conditions, treatments, medications, side effects, or risks that may apply to a specific patient. It is not intended to be medical advice or a substitute for the medical advice, diagnosis, or treatment of a health care provider based on the health care provider's examination and assessment of a patient’s specific and unique circumstances. Patients must speak with a health care provider for complete information about their health, medical questions, and treatment options, including any risks or benefits regarding use of medications. This information does not endorse any treatments or medications as safe, effective, or approved for treating a specific patient. Up[employer] Inc[employer] and its affiliates disclaim any warranty or liability relating to this information or the use thereof. The use of this information is governed by the Terms of Use, available at  https://www.woltersJammituwer.com/en/know/clinical-effectiveness-terms   Copyright   Copyright © 2024 UpToDate, Inc[employer] and its affiliates and/or licensors. All rights reserved.

## 2024-12-17 LAB
LAB AP GYN PRIMARY INTERPRETATION: NORMAL
Lab: NORMAL

## 2024-12-19 ENCOUNTER — RESULTS FOLLOW-UP (OUTPATIENT)
Age: 64
End: 2024-12-19

## 2024-12-27 ENCOUNTER — OFFICE VISIT (OUTPATIENT)
Dept: OBGYN CLINIC | Facility: CLINIC | Age: 64
End: 2024-12-27
Payer: COMMERCIAL

## 2024-12-27 ENCOUNTER — APPOINTMENT (OUTPATIENT)
Dept: RADIOLOGY | Facility: CLINIC | Age: 64
End: 2024-12-27
Payer: COMMERCIAL

## 2024-12-27 VITALS — HEIGHT: 62 IN | BODY MASS INDEX: 35.11 KG/M2 | WEIGHT: 190.8 LBS

## 2024-12-27 DIAGNOSIS — M25.552 LEFT HIP PAIN: ICD-10-CM

## 2024-12-27 DIAGNOSIS — M25.462 SWELLING OF KNEE JOINT, LEFT: ICD-10-CM

## 2024-12-27 DIAGNOSIS — M25.562 LEFT KNEE PAIN, UNSPECIFIED CHRONICITY: ICD-10-CM

## 2024-12-27 DIAGNOSIS — M54.16 RADICULOPATHY, LUMBAR REGION: Primary | ICD-10-CM

## 2024-12-27 DIAGNOSIS — M17.12 PRIMARY OSTEOARTHRITIS OF LEFT KNEE: ICD-10-CM

## 2024-12-27 PROCEDURE — 73562 X-RAY EXAM OF KNEE 3: CPT

## 2024-12-27 PROCEDURE — 99204 OFFICE O/P NEW MOD 45 MIN: CPT | Performed by: FAMILY MEDICINE

## 2024-12-27 PROCEDURE — 73502 X-RAY EXAM HIP UNI 2-3 VIEWS: CPT

## 2024-12-27 RX ORDER — METHYLPREDNISOLONE 4 MG/1
TABLET ORAL
Qty: 1 EACH | Refills: 0 | Status: SHIPPED | OUTPATIENT
Start: 2024-12-27

## 2024-12-27 NOTE — LETTER
December 27, 2024     Patient: Saira Rosario  YOB: 1960  Date of Visit: 12/27/2024      To Whom it May Concern:    Saira Rosario is under my professional care. Saira was seen in my office on 12/27/2024. Please excuse from work until 1/2/25    If you have any questions or concerns, please don't hesitate to call.         Sincerely,          Daryl aMckey DO        CC: No Recipients
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
## 2025-02-17 ENCOUNTER — TELEPHONE (OUTPATIENT)
Dept: PAIN MEDICINE | Facility: CLINIC | Age: 65
End: 2025-02-17

## 2025-03-26 NOTE — PROGRESS NOTES
Assessment:  1. Radiculopathy, lumbar region        Plan:  Orders Placed This Encounter   Procedures    Ambulatory referral to Physical Therapy     Standing Status:   Future     Expiration Date:   3/31/2026     Referral Priority:   Routine     Referral Type:   Physical Therapy     Referral Reason:   Specialty Services Required     Requested Specialty:   Physical Therapy     Number of Visits Requested:   1     Expiration Date:   3/31/2026    EMG 1 Limb     Standing Status:   Future     Expiration Date:   3/31/2026     Does the patient have an external cardiac device (LVAD)?:   No     Did the onset of symptoms occur more than 2 weeks ago from today?:   No     Affected Location (Up to 2 Per Order)::   Lower Left Limb     Reason for Exam::   lumbar radiculopathy     Reason for Exam::   peripheral neuropathy     Reason for Exam::   peroneal neuropathy       New Medications Ordered This Visit   Medications    acetaminophen (TYLENOL) 500 mg tablet     Sig: Take 500 mg by mouth every 6 (six) hours as needed for mild pain    gabapentin (NEURONTIN) 300 mg capsule     Sig: Take 1 capsule (300 mg total) by mouth 3 (three) times a day Start by taking 1 tablet at bedtime for 2 days. Then increase to 1 tablet in the evening and 1 tablet at bedtime for 2 days. Then increase to 1 tablet in the morning, 1 tablet in the evening, and 1 tablet at bedtime thereafter.     Dispense:  90 capsule     Refill:  0       My impressions and treatment recommendations were discussed in detail with the patient, who verbalized understanding and had no further questions.    64-year-old female presents her office with chief complaint of left lower extremity pain.  She reports 1 year of pain that starts in the knee and radiates into the lower leg.  Involves the whole lower leg below the knee.  She reports intermittent radiation from the back down to the leg as well.  Symptoms have been severe over the past week.  She is neurologically intact on exam  today.  However I reviewed MRI report which does show notable compressive findings.  There is notable foraminal stenosis at the left L3 and L4 levels.  Prior to considering epidural injection, would like for her to first exhaust conservative therapy.  She has not done physical therapy for the lower back.  I think she would benefit from course of aquatic therapy.  Additionally, I will have her start gabapentin 300 mg 3 times daily on a dose titration schedule.  She will return in 6 to 8 weeks or sooner medically necessary.  If in the interim she has severely escalating pain then we did discuss left L3-L4 TFESI if necessary.    Given that pain has been fairly localized below left knee, will also order EMG of the LLE to confirm it is coming from the back.     [place] Prescription Drug Monitoring Program report was reviewed and was appropriate     Complete risks and benefits including bleeding, infection, tissue reaction, nerve injury and allergic reaction were discussed. The approach was demonstrated using models and literature was provided. Verbal and written consent was obtained.    Discharge instructions were provided. I personally saw and examined the patient and I agree with the above discussed plan of care.    History of Present Illness:    Saira Rosario is a 64 y.o. female who presents to [facility] for initial evaluation of the above stated pain complaints. The patient has a past medical and chronic pain history as outlined in the assessment section. She was referred by Daryl Mackey DO  [address] [place] [address],  [place] [address] .    Chief complaint of severe pain in the left leg below the knee.  Severe over the past 1.  5 out of 10.  Sharp and throbbing.    Pain is increased with bending, walking, exercise.  Decreased with lying down, standing, sitting, relaxation.    Currently using acetaminophen with relief.    Moderately with heat/ice therapy.    No tobacco or marijuana use.   Not allergic to latex or contrast dye.    She reports year long pain in the left knee going down the left. Reports knee pain into the whole left lower leg. Over the last week she has had severe pain with weakness. She reports dragging her leg from pain lately. She does report initermittent pain radiating from her back down the leg.     Review of Systems:    Review of Systems   Constitutional:  Positive for unexpected weight change.   Respiratory:  Positive for wheezing.    Cardiovascular:  Positive for leg swelling.   Musculoskeletal:  Positive for arthralgias, joint swelling and myalgias.           Past Medical History:   Diagnosis Date    Endometrial polyp 05/13/2024    High blood pressure     High cholesterol        Past Surgical History:   Procedure Laterality Date    BREAST LUMPECTOMY Right     benign    HERNIA REPAIR      POLYPECTOMY N/A 5/13/2024    Procedure: POLYPECTOMY;  Surgeon: Rama Pickens DO;  Location: [facility];  Service: Gynecology    NE HYSTEROSCOPY BX ENDOMETRIUM&/POLYPC W/WO D&C N/A 5/13/2024    Procedure: D&C HYSTEROSCOPY, EXAM UNDER ANESTHESIA;  Surgeon: Rama Pickens DO;  Location: [facility];  Service: Gynecology       Family History   Problem Relation Age of Onset    Fibroids Mother     Prostate cancer Father     Fibroids Sister     Breast cancer Maternal Grandmother     Colon cancer Neg Hx     Ovarian cancer Neg Hx        Social History     Occupational History    Not on file   Tobacco Use    Smoking status: Never    Smokeless tobacco: Never   Vaping Use    Vaping status: Never Used   Substance and Sexual Activity    Alcohol use: Never    Drug use: Never    Sexual activity: Yes         Current Outpatient Medications:     acetaminophen (TYLENOL) 500 mg tablet, Take 500 mg by mouth every 6 (six) hours as needed for mild pain, Disp: , Rfl:     atorvastatin (LIPITOR) 20 mg tablet, Take 20 mg by mouth daily, Disp: , Rfl:     gabapentin (NEURONTIN) 300 mg capsule, Take 1 capsule (300 mg  "total) by mouth 3 (three) times a day Start by taking 1 tablet at bedtime for 2 days. Then increase to 1 tablet in the evening and 1 tablet at bedtime for 2 days. Then increase to 1 tablet in the morning, 1 tablet in the evening, and 1 tablet at bedtime thereafter., Disp: 90 capsule, Rfl: 0    lisinopril (ZESTRIL) 10 mg tablet, Take 1 tablet by mouth every morning, Disp: , Rfl:     liraglutide (VICTOZA) injection, Inject under the skin daily, Disp: , Rfl:     methylPREDNISolone 4 MG tablet therapy pack, Use as directed on package (Patient not taking: Reported on 3/31/2025), Disp: 1 each, Rfl: 0    No Known Allergies    Physical Exam:    Ht 5' 2\" (1.575 m)   Wt 88 kg (194 lb)   BMI 35.48 kg/m²     Constitutional: normal, well developed, well nourished, alert, in no distress and non-toxic and no overt pain behavior.  Eyes: anicteric  HEENT: grossly intact  Neck: supple, symmetric, trachea midline and no masses   Pulmonary:even and unlabored  Cardiovascular:No edema or pitting edema present  Skin:Normal without rashes or lesions and well hydrated  Psychiatric:Mood and affect appropriate  Neurologic:Cranial Nerves II-XII grossly intact  Musculoskeletal:normal    Lumbar Spine Exam    Appearance:  Normal lordosis  Sensory:  no sensory deficits noted  Motor Strength:  Left hip flexion:  5/5  Left hip extension:  5/5  Right hip flexion:  5/5  Right hip extension:  5/5  Left knee flexion:  5/5  Left knee extension:  5/5  Right knee flexion:  5/5  Right knee extension:  5/5  Left foot dorsiflexion:  5/5  Left foot plantar flexion:  5/5  Right foot dorsiflexion:  5/5  Right foot plantar flexion:  5/5  Reflexes:  Left Patellar:  2+   Right Patellar:  2+   Left Achilles:  2+   Right Achilles:  2+     Imaging    MRI LUMBAR SPINE WO CONTRAST  Order: [id number]  Impression    IMPRESSION  1.  Moderate levoscoliosis of the mid lumbar spine with associated advanced degenerative disc and bony changes from L2-3 through L5-S1 as " discussed above.  2.  Highlights of degenerative changes include moderate right and severe left foraminal narrowing at L3-4 secondary to an asymmetric disc bulge.  In addition there is severe right L5-S1 neural foraminal narrowing along with severe right L5-S1 lateral recess narrowing secondary to a large right paramedian L5-S1 disc herniation.    Note: Our Diagnostic Medicine Client Service Representatives (CSRs) facilitated communication of above results to the ordering service today at12:09.    In compliance with [place] act 112 an electronic request has been submitted to our office staff today at 12:09 pm for patient notification of an exam with positive findings requiring follow-up evaluation via the ordering provider or a subspecialty referral.  Narrative    EXAM  MRI L SPINE WO CONTRAST - 5/17/2024 10:25 am    HISTORY  Moderate dextroscoliosis.  Low back pain; Low back pain, no complicating feature; No chronic LBP duration >= 3 months    COMPARISON  X-ray lumbar spine from 12/07/2023    TECHNIQUE  Procedure: Sagittal and axial T1 and T2 weighted imaging obtained through the lumbosacral spine without contrast.    FINDINGS  For the purpose of this dictation, when counting from the bottom up there are 5 segmented lumbar vertebral bodies and the S1 body is incorporated into the sacrum.    There is moderate dextroscoliosis of the lumbar spine with greatest degree of angulation at the L2-3 level.  In the sagittal plane the vertebral body alignment appears satisfactory.  There is straightening of the usual lumbar lordosis.  The conus is of normal caliber and signal intensity and terminates at the L1 level.  There is moderate disc flattening at the L2-3 through L4-5 levels with reactive changes of the vertebral endplates.  No spondylolysis is detected.    Axial images show the following:  T12-L1, L1-2: No significant disc bulge and no spinal or foraminal stenosis identified.  L2-3: There is moderate spinal stenosis with  mild right and moderate left neural foraminal narrowing secondary to disc flattening, dextroscoliosis and moderate hypertrophic facet arthropathy.  In addition there is a far left lateral disc protrusion associated with the sharp dextroscoliotic curvature at this level.  There is also clumping of the proximal cauda equina within the thecal sac.  L3-4: There is moderate spinal stenosis with moderate right and severe left neural foraminal narrowing secondary to disc flattening, a broad posterior disc bulge extending through the neural foramina, and hypertrophic facet arthropathy bilaterally.  L4-5: There is moderate spinal stenosis with mild right and moderate left neural foraminal narrowing secondary to disc flattening and a broad posterior disc-osteophyte bulge extending through the neural foramina bilaterally.  L5-S1: There is moderate right paramedian spinal stenosis with severe right and mild left neural foraminal narrowing secondary to disc flattening, a broad posterior disc bulge, and a superimposed right paramedian disc protrusion extending into the right lateral recess resulting in severe right neural foraminal narrowing, severe right lateral recess narrowing with impingement of the transiting right S1 nerve root and moderate spinal stenosis. There is only mild left foraminal narrowing present.    LEFT KNEE  12/27/24     INDICATION:   Pain in left knee. Effusion, left knee.      COMPARISON:  None.     VIEWS:  XR KNEE 3 VW LEFT NON INJURY      FINDINGS:     There is no acute fracture or dislocation.     There is a small joint effusion.     Mild to moderate medial compartment degenerative narrowing. Osteophytes of the medial tibial plateau. Patellofemoral space is preserved.     No lytic or blastic osseous lesion. Small exostosis of the medial femoral epicondyle.     Soft tissues are unremarkable.     IMPRESSION:        Mild to moderate medial compartment degenerative changes as above.  Minimal varus angulation  secondarily.  Small effusion. No fracture.    No orders to display       Orders Placed This Encounter   Procedures    Ambulatory referral to Physical Therapy    EMG 1 Limb

## 2025-03-31 ENCOUNTER — CONSULT (OUTPATIENT)
Dept: PAIN MEDICINE | Facility: CLINIC | Age: 65
End: 2025-03-31
Payer: COMMERCIAL

## 2025-03-31 VITALS — BODY MASS INDEX: 35.7 KG/M2 | WEIGHT: 194 LBS | HEIGHT: 62 IN

## 2025-03-31 DIAGNOSIS — M54.16 RADICULOPATHY, LUMBAR REGION: Primary | ICD-10-CM

## 2025-03-31 PROCEDURE — 99244 OFF/OP CNSLTJ NEW/EST MOD 40: CPT | Performed by: STUDENT IN AN ORGANIZED HEALTH CARE EDUCATION/TRAINING PROGRAM

## 2025-03-31 RX ORDER — ACETAMINOPHEN 500 MG
500 TABLET ORAL EVERY 6 HOURS PRN
COMMUNITY

## 2025-03-31 RX ORDER — GABAPENTIN 300 MG/1
300 CAPSULE ORAL 3 TIMES DAILY
Qty: 90 CAPSULE | Refills: 0 | Status: SHIPPED | OUTPATIENT
Start: 2025-03-31

## 2025-03-31 NOTE — PATIENT INSTRUCTIONS
"Patient Education     Epidural injection   The Basics   Written by the doctors and editors at [facility]   What is an epidural injection? -- An epidural injection can be used to treat a condition called \"radiculopathy.\" This is the medical term for the pain, weakness, numbness, or tingling that happens when nerves coming from the spinal cord get pinched or damaged.  The doctor injects medicines into the space outside the covering of the spinal cord (figure 1). This is similar to an \"epidural\" that is used for pain relief during labor and childbirth.  Epidural injections can be given into different parts of your back:   Cervical epidural injection - Used to help with pain in the head or arms.   Thoracic epidural injection - Used to help with pain in the upper or middle back.   Lumbar epidural injection - Used to help with pain in the lower back or legs.  How do I prepare for an epidural injection? -- The doctor or nurse will tell you if you need to do anything special to prepare. Before your procedure, your doctor will do an exam. They might send you to get tests, such as:   X-ray, ultrasound, or other imaging tests - Imaging tests create pictures of the inside of the body.  Your doctor will also ask you about your \"health history.\" This involves asking you questions about any health problems you have or had in the past, past surgeries, and any medicines you take. Tell them about:   Any medicines you are taking - This includes any prescription or \"over-the-counter\" medicines you use, plus any herbal supplements you take. It helps to write down and bring a list of any medicines you take, or bring a bag with all of your medicines with you.   Any allergies you have   Any bleeding problems you have - Certain medicines, including some herbs and supplements, can increase the risk of bleeding. Some health conditions also increase this risk.  You will also get information about:   Eating and drinking before your procedure - In " "some cases, you might need to \"fast\" before surgery. This means not eating or drinking anything for a period of time. In other cases, you might be allowed to have liquids until a short time before the procedure. Whether you need to fast, and for how long, depends on the procedure you are having.   What help you will need when you go home - For example, you might need to have someone else bring you home or stay with you for some time while you recover.  Ask the doctor or nurse if you have questions or if there is anything you do not understand.  What happens during an epidural injection? -- When it is time for the procedure:   You might get an \"IV,\" which is a thin tube that goes into a vein. This can be used to give you fluids and medicines.   You will get anesthesia medicines to numb the area where the doctor will give the injection. This is to make sure that you do not feel pain during the procedure. You might also get medicines to make you relax and feel sleepy, called \"sedatives.\"   The doctors and nurses will monitor your breathing, blood pressure, and heart rate during the procedure.   The doctor might use a continuous X-ray called \"fluoroscopy.\" This is to help make sure that the medicines are injected into the right place. The doctor might also inject a dye to see where to give the medicine.   The doctor will place a needle through your skin and inject the medicine into a space near your spine. Then, they will remove the needle and cover the area with a clean bandage.   The procedure takes 15 to 30 minutes.  What happens after an epidural injection? -- After your procedure, the staff will watch you closely for a short time. It might take a few days before you feel the effects of the epidural injection.  Before you go home, make sure that you know what problems to look out for and when to call the doctor. Make sure that you understand your doctor's or nurse's instructions. Ask questions about anything you do " "not understand.  For the rest of the day after your procedure:   Try to rest. Limit activities like exercise or driving.   The doctor might recommend an over-the-counter pain medicine. These include acetaminophen (sample brand name: Tylenol), ibuprofen (sample brand names: Advil, Motrin), and naproxen (sample brand name: Aleve).   Ice can help with pain and swelling. Put a cold gel pack, bag of ice, or bag of frozen vegetables on the injection site area every 1 to 2 hours, for 15 minutes each time. Put a thin towel between the ice (or other cold object) and the skin.  What are the risks of an epidural injection? -- Your doctor will talk to you about all of the possible risks, and answer your questions. Possible risks include:   Bleeding   Infection   Headache   Nerve injury  When should I call the doctor? -- Call for emergency help right away (in the [place] and [place], call 9-1-1) if:   You can't move your arms or legs.  Call for advice if:   You have a fever of 100.4°F (38°C) or higher, or chills.   You have redness or swelling around the injection site.   You have a headache.   Your arms or legs are numb, weak, or tingly.  All topics are updated as new evidence becomes available and our peer review process is complete.  This topic retrieved from [employer] on: May 15, 2024.  Topic 908056 Version 1.0  Release: 32.4.3 - C32.134  © 2024 UpToDate, Inc. and/or its affiliates. All rights reserved.  figure 1: Epidural injection     Duringan epidural injection, the doctor inserts a needle between 2 of the bones thatmake up the spine. Then, they inject medicines into the area around the spinalcord. This is an illustration of a \"lumbar\" epidural injection, which is given into the low back. The doctor can place the needle in other areas to treat other types of pain.  Graphic 334044 Version 1.0  Consumer Information Use and Disclaimer   Disclaimer: This generalized information is a limited summary of diagnosis, treatment, and/or " medication information. It is not meant to be comprehensive and should be used as a tool to help the user understand and/or assess potential diagnostic and treatment options. It does NOT include all information about conditions, treatments, medications, side effects, or risks that may apply to a specific patient. It is not intended to be medical advice or a substitute for the medical advice, diagnosis, or treatment of a health care provider based on the health care provider's examination and assessment of a patient's specific and unique circumstances. Patients must speak with a health care provider for complete information about their health, medical questions, and treatment options, including any risks or benefits regarding use of medications. This information does not endorse any treatments or medications as safe, effective, or approved for treating a specific patient. UpToDate, Inc. and its affiliates disclaim any warranty or liability relating to this information or the use thereof.The use of this information is governed by the Terms of Use, available at https://www.[employer].com/en/know/clinical-effectiveness-terms. 2024© UpToDate, Inc. and its affiliates and/or licensors. All rights reserved.  Copyright   © 2024 UpToDate, Inc. and/or its affiliates. All rights reserved.

## 2025-07-22 ENCOUNTER — TELEPHONE (OUTPATIENT)
Age: 65
End: 2025-07-22

## 2025-07-22 NOTE — TELEPHONE ENCOUNTER
Caller: pt    Doctor: Dr. gaffney    Reason for call: pt would like a refill of her gabapentin.    Call back#: [phone]

## (undated) DEVICE — CYSTO TUBING SINGLE IRRIGATION

## (undated) DEVICE — SPONGE LAP 18 X 18 IN

## (undated) DEVICE — SYRINGE 10ML LL

## (undated) DEVICE — SPONGE 4 X 4 XRAY 16 PLY STRL LF RFD

## (undated) DEVICE — STRL ALLENTOWN HYSTEROSCOPY PK: Brand: CARDINAL HEALTH

## (undated) DEVICE — NEEDLE SPINAL18G X 3.5 IN QUINCKE

## (undated) DEVICE — TISSUE REMOVAL SYSTEM RESECTING DEVICE: Brand: SYMPHION

## (undated) DEVICE — GLOVE INDICATOR PI UNDERGLOVE SZ 6.5 BLUE

## (undated) DEVICE — GLOVE SRG LF STRL BGL SKNSNS 6 PF

## (undated) DEVICE — LIGHT GLOVE GREEN

## (undated) DEVICE — GLOVE INDICATOR UNDERGLOVE SZ 6 BLUE

## (undated) DEVICE — TISSUE REMOVAL SYSTEM FLUID MANAGEMENT ACCESSORIES: Brand: SYMPHION

## (undated) DEVICE — 4-PORT MANIFOLD: Brand: NEPTUNE 2

## (undated) DEVICE — PVC URETHRAL CATHETER: Brand: DOVER

## (undated) DEVICE — SCD SEQUENTIAL COMPRESSION COMFORT SLEEVE MEDIUM KNEE LENGTH: Brand: KENDALL SCD

## (undated) DEVICE — PAD GROUNDING DUAL ADULT

## (undated) DEVICE — CHLORHEXIDINE 4PCT 4 OZ

## (undated) DEVICE — DRAPE EQUIPMENT RF WAND

## (undated) DEVICE — SPECIMEN SOCK - SHORT: Brand: MEDI-VAC

## (undated) DEVICE — MAYO STAND COVER: Brand: CONVERTORS